# Patient Record
(demographics unavailable — no encounter records)

---

## 2024-11-08 NOTE — INTERPRETER SERVICES
[[employer] ] : provided by [employer]   [Interpreters_IDNumber] : [id number] [TWNoteComboBox1] : Ugandan

## 2024-11-08 NOTE — HISTORY OF PRESENT ILLNESS
[FreeTextEntry8] : Patient is a 39F PMH HLD, carpel tunnel syndrome presents for complaints of neck and b/l shoulder and back pain for 3 months. Patient started experiencing this pain 3 months but feels the pain is progressively getting worse. Patient reports pain starts in the occipital region (intermittent headaches with no nausea or vomiting or blurry vision), pain travels down into b/l shoulders and down the length of the spine. Pain is always persistent, worse at night. Patient intermittently takes alleve and tylenol daily which helps a bit. Pt taking 3 pills of alleve  BID x 1 month.  No fevers or chills. No trauma events. 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 No EtOH. No tobacco. No other substance use.  No additional life stressors.

## 2024-11-08 NOTE — PHYSICAL EXAM
[No Acute Distress] : no acute distress [Well Nourished] : well nourished [Well Developed] : well developed [Well-Appearing] : well-appearing [Normal Sclera/Conjunctiva] : normal sclera/conjunctiva [PERRL] : pupils equal round and reactive to light [EOMI] : extraocular movements intact [Normal Outer Ear/Nose] : the outer ears and nose were normal in appearance [Normal Oropharynx] : the oropharynx was normal [Supple] : supple [No Respiratory Distress] : no respiratory distress  [Clear to Auscultation] : lungs were clear to auscultation bilaterally [Normal Rate] : normal rate  [Regular Rhythm] : with a regular rhythm [Normal S1, S2] : normal S1 and S2 [No Edema] : there was no peripheral edema [Soft] : abdomen soft [Non Tender] : non-tender [Non-distended] : non-distended [No HSM] : no HSM [Normal Bowel Sounds] : normal bowel sounds [No Joint Swelling] : no joint swelling [Grossly Normal Strength/Tone] : grossly normal strength/tone [No Rash] : no rash [Coordination Grossly Intact] : coordination grossly intact [No Focal Deficits] : no focal deficits [Normal Gait] : normal gait [Normal Affect] : the affect was normal [Normal Insight/Judgement] : insight and judgment were intact [No CVA Tenderness] : no CVA  tenderness [No Spinal Tenderness] : no spinal tenderness [Normal] : motor strength was normal in all muscle groups [de-identified] : tenderness eliicited upon palpation of posterior neck and b/l trapezius  [de-identified] : +Tinnel sign,

## 2024-11-08 NOTE — ASSESSMENT
[FreeTextEntry1] : 40 y/o F PMH carpal tunnel, pre DM here for CPE.  #Carpal Tunnel Wrist splint ordered.  TDAP today. RTC in 6 months to 1 year for follow up.

## 2024-11-08 NOTE — REVIEW OF SYSTEMS
[Joint Pain] : joint pain [Muscle Pain] : muscle pain [Back Pain] : back pain [Negative] : Psychiatric [Joint Stiffness] : joint stiffness [Headache] : headache [Fever] : no fever [Chills] : no chills [Fatigue] : no fatigue [Recent Change In Weight] : ~T no recent weight change [Joint Swelling] : no joint swelling [Muscle Weakness] : no muscle weakness [Dizziness] : no dizziness [Fainting] : no fainting [Unsteady Walking] : no ataxia

## 2025-01-10 NOTE — HISTORY OF PRESENT ILLNESS
[FreeTextEntry1] : f/u [de-identified] : Ms. Laura Tavares is a 41 yo F with pmh of HLD and CTS presenting for follow up. Parvovirus PCR was positive so suspicion for viral etiology. Saw Dr. Flood in November with persistent symptoms, got flexeril. Overall feeling better. Has been going to physical therapy, went to 6 sessions and now feels much better in regard to neck and arm pain. Has been taking flexeril at night about twice a week. Carpal tunnel pain still bothering her, braces have not helped, planning to see ortho next week. Denies chills, headache, infectious contacts, changes in vision or hearing, SOB, N/v, abdominal pain, diarrhea, constipation, dysuria, pruritis, rashes.   PMH: HLD, CTS PSH: none Meds: none Allergies: NKDA, NKFA SH: Works as a , lives with spouse and son, nonsmoker, no alc or drug use ROS: See HPI, otherwise negative.

## 2025-01-10 NOTE — ASSESSMENT
[FreeTextEntry1] : Ms. Laura Tavares is a 41 yo F with pmh of HLD and CTS presenting for f/u  #Cervical myofascial pain syndrome - Presentation to ED for fever, rash, muscle pains - high suspicion for parvovirus given presentation, child at home - Elevated Parvovirus PCR  - Elevated ESR inpatient that resolved on last lab draw, mildly elevated CRP  - Symptoms have now resolved  - Cont flexeril as needed  - Cont PT  #Carpal tunnel syndrome - Patient wearing splints occasionally, has not had releif - Ortho appt next week 1/15  #HCM - As above in note, can be addressed further at later visit but patient largely up to date - CPE in May/June  Case d/w Dr. Robert Strong, PGY-2

## 2025-01-10 NOTE — END OF VISIT
[] : Resident [Time Spent: ___ minutes] : I have spent [unfilled] minutes of time on the encounter which excludes teaching and separately reported services. [FreeTextEntry3] : 21 minutes of total time was spent on the date of the encounter. This included time for review of medical records and laboratory results, face to face time (including the physical examination counseling and coordination of care), time for patient education, treatment plans, answering questions, communicating with other doctors and for medical record documentation.  The time spent is separate from time spent on separately billed procedures.

## 2025-01-10 NOTE — INTERPRETER SERVICES
[[employer] ] : provided by [employer]   [Time Spent: ____ minutes] : Total time spent using  services: [unfilled] minutes. The patient's primary language is not English thus required  services. [Interpreters_IDNumber] : [id number] [Interpreters_FullName] : Esperanza [TWNoteComboBox1] : Venezuelan

## 2025-01-10 NOTE — INTERPRETER SERVICES
[[employer] ] : provided by [employer]   [Time Spent: ____ minutes] : Total time spent using  services: [unfilled] minutes. The patient's primary language is not English thus required  services. [Interpreters_IDNumber] : [id number] [Interpreters_FullName] : Esperanza [TWNoteComboBox1] : Vincentian

## 2025-01-10 NOTE — HISTORY OF PRESENT ILLNESS
[FreeTextEntry1] : f/u [de-identified] : Ms. Laura Tavares is a 41 yo F with pmh of HLD and CTS presenting for follow up. Parvovirus PCR was positive so suspicion for viral etiology. Saw Dr. Flood in November with persistent symptoms, got flexeril. Overall feeling better. Has been going to physical therapy, went to 6 sessions and now feels much better in regard to neck and arm pain. Has been taking flexeril at night about twice a week. Carpal tunnel pain still bothering her, braces have not helped, planning to see ortho next week. Denies chills, headache, infectious contacts, changes in vision or hearing, SOB, N/v, abdominal pain, diarrhea, constipation, dysuria, pruritis, rashes.   PMH: HLD, CTS PSH: none Meds: none Allergies: NKDA, NKFA SH: Works as a , lives with spouse and son, nonsmoker, no alc or drug use ROS: See HPI, otherwise negative.

## 2025-01-10 NOTE — ASSESSMENT
[FreeTextEntry1] : Ms. Laura Tavares is a 39 yo F with pmh of HLD and CTS presenting for f/u  #Cervical myofascial pain syndrome - Presentation to ED for fever, rash, muscle pains - high suspicion for parvovirus given presentation, child at home - Elevated Parvovirus PCR  - Elevated ESR inpatient that resolved on last lab draw, mildly elevated CRP  - Symptoms have now resolved  - Cont flexeril as needed  - Cont PT  #Carpal tunnel syndrome - Patient wearing splints occasionally, has not had releif - Ortho appt next week 1/15  #HCM - As above in note, can be addressed further at later visit but patient largely up to date - CPE in May/June  Case d/w Dr. Robert Strong, PGY-2

## 2025-05-30 NOTE — HISTORY OF PRESENT ILLNESS
[FreeTextEntry1] : CPE [de-identified] : Ms. Laura Tavares is a 39 yo F with pmh of HLD, cervical myofascial pain syndrome and carpal tunnel syndrome presenting for CPE.  #Carpal Tunnel Syndrome Better than previously s/p steroid injection. Sleeps with wrist brace which helps symptoms.  #Cervical myofascial pain syndrome Resolved s/p physical therapy and short course flexeril  #Pruritis Itching in hands and feet once or twice a week for a few minutes. Has been happening since parvovirus. There's never a visible rash.  Denies chills, headache, infectious contacts, changes in vision or hearing, SOB, N/v, abdominal pain, diarrhea, constipation, dysuria, pruritis, rashes.   PMH: As above PSH: none Meds: none Allergies: NKDA, NKFA SH: Works as a , lives with spouse and son, nonsmoker, no alc or drug use ROS: See HPI, otherwise negative.

## 2025-05-30 NOTE — HISTORY OF PRESENT ILLNESS
[FreeTextEntry1] : CPE [de-identified] : Ms. Laura Tavares is a 41 yo F with pmh of HLD, cervical myofascial pain syndrome and carpal tunnel syndrome presenting for CPE.  #Carpal Tunnel Syndrome Better than previously s/p steroid injection. Sleeps with wrist brace which helps symptoms.  #Cervical myofascial pain syndrome Resolved s/p physical therapy and short course flexeril  #Pruritis Itching in hands and feet once or twice a week for a few minutes. Has been happening since parvovirus. There's never a visible rash.  Denies chills, headache, infectious contacts, changes in vision or hearing, SOB, N/v, abdominal pain, diarrhea, constipation, dysuria, pruritis, rashes.   PMH: As above PSH: none Meds: none Allergies: NKDA, NKFA SH: Works as a , lives with spouse and son, nonsmoker, no alc or drug use ROS: See HPI, otherwise negative.

## 2025-05-30 NOTE — HEALTH RISK ASSESSMENT
[No] : No [Never (0 pts)] : Never (0 points) [No falls in past year] : Patient reported no falls in the past year [0] : 2) Feeling down, depressed, or hopeless: Not at all (0) [Never] : Never [de-identified] : No [[id number]] : 0

## 2025-05-30 NOTE — HEALTH RISK ASSESSMENT
[No] : No [Never (0 pts)] : Never (0 points) [No falls in past year] : Patient reported no falls in the past year [0] : 2) Feeling down, depressed, or hopeless: Not at all (0) [Never] : Never [de-identified] : No [[id number]] : 0

## 2025-05-30 NOTE — HISTORY OF PRESENT ILLNESS
[FreeTextEntry1] : CPE [de-identified] : Ms. Laura Tavares is a 39 yo F with pmh of HLD, cervical myofascial pain syndrome and carpal tunnel syndrome presenting for CPE.  #Carpal Tunnel Syndrome Better than previously s/p steroid injection. Sleeps with wrist brace which helps symptoms.  #Cervical myofascial pain syndrome Resolved s/p physical therapy and short course flexeril  #Pruritis Itching in hands and feet once or twice a week for a few minutes. Has been happening since parvovirus. There's never a visible rash.  Denies chills, headache, infectious contacts, changes in vision or hearing, SOB, N/v, abdominal pain, diarrhea, constipation, dysuria, pruritis, rashes.   PMH: As above PSH: none Meds: none Allergies: NKDA, NKFA SH: Works as a , lives with spouse and son, nonsmoker, no alc or drug use ROS: See HPI, otherwise negative.

## 2025-05-30 NOTE — HEALTH RISK ASSESSMENT
[No] : No [Never (0 pts)] : Never (0 points) [No falls in past year] : Patient reported no falls in the past year [0] : 2) Feeling down, depressed, or hopeless: Not at all (0) [Never] : Never [de-identified] : No [[id number]] : 0

## 2025-05-30 NOTE — PLAN
[FreeTextEntry1] : #carpal tunnel Resolved mostly with splints + 1 steroid injection -NTD  #parvovirus #elevated CRP/GENA CRP and GENA were significant during parvovirus infection; GENA showed a centromere pattern and was higher than expected during active infection -Repeat GENA/CRP; consider scleroderma work-up if still positive  #Pre-Dm Patient has a history of pre-dm with A1C elevated to 6 and low HDL with elevated tga as well as weight centralized in her abdomen -initial metformin 500 mg daily  #Infertility -Recommended trial of ubiquinol for improved egg quality  #HCM -Mammogram refferal -PAP/HPV negative in 2023; not due till 2028 -RTC in 10 weeks to evaluate metformin tolerance  -f/u labs (lipids/A1C/CBC/CMP)

## 2025-05-30 NOTE — HISTORY OF PRESENT ILLNESS
[FreeTextEntry1] : CPE [de-identified] : Ms. Laura Tavares is a 41 yo F with pmh of HLD, cervical myofascial pain syndrome and carpal tunnel syndrome presenting for CPE.  #Carpal Tunnel Syndrome Better than previously s/p steroid injection. Sleeps with wrist brace which helps symptoms.  #Cervical myofascial pain syndrome Resolved s/p physical therapy and short course flexeril  #Pruritis Itching in hands and feet once or twice a week for a few minutes. Has been happening since parvovirus. There's never a visible rash.  Denies chills, headache, infectious contacts, changes in vision or hearing, SOB, N/v, abdominal pain, diarrhea, constipation, dysuria, pruritis, rashes.   PMH: As above PSH: none Meds: none Allergies: NKDA, NKFA SH: Works as a , lives with spouse and son, nonsmoker, no alc or drug use ROS: See HPI, otherwise negative.

## 2025-05-30 NOTE — HEALTH RISK ASSESSMENT
[No] : No [Never (0 pts)] : Never (0 points) [No falls in past year] : Patient reported no falls in the past year [0] : 2) Feeling down, depressed, or hopeless: Not at all (0) [Never] : Never [de-identified] : No [[id number]] : 0